# Patient Record
Sex: FEMALE | Race: BLACK OR AFRICAN AMERICAN
[De-identification: names, ages, dates, MRNs, and addresses within clinical notes are randomized per-mention and may not be internally consistent; named-entity substitution may affect disease eponyms.]

---

## 2019-07-15 ENCOUNTER — HOSPITAL ENCOUNTER (EMERGENCY)
Dept: HOSPITAL 97 - ER | Age: 29
Discharge: HOME | End: 2019-07-15
Payer: SELF-PAY
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
## 2019-07-15 DIAGNOSIS — T31.0: ICD-10-CM
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
## 2019-07-15 DIAGNOSIS — T23.692A: Primary | ICD-10-CM
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
## 2019-07-15 PROCEDURE — 99283 EMERGENCY DEPT VISIT LOW MDM: CPT

## 2019-07-15 NOTE — EDPHYS
Physician Documentation                                                                           

 [facility]                                                                 

Name: Bere Perez                                                                              

Age: 28 yrs                                                                                       

Sex: Female                                                                                       

: 1990                                                                                   

MRN: [id number]                                                                                   

Arrival Date: 07/15/2019                                                                          

Time: 14:36                                                                                       

Account#: [id number]                                                                            

Bed 24                                                                                            

Private MD: None, None                                                                            

ED Physician Franklin Lucia                                                                      

HPI:                                                                                              

07/15                                                                                             

14:50 This 28 yrs old Black Female presents to ER via Ambulatory with complaints of Chemical  nikita 

      Exposure.                                                                                   

14:50 The patient or guardian reports pain. The complaints affect the left wrist diffusely.   nikita 

      Context: The problem was sustained at an industrial site. Onset: The symptoms/episode       

      began/occurred just prior to arrival. Modifying factors: The symptoms are alleviated by     

      elevation, holding still, ice/coldpack to affected area, the symptoms are aggravated by     

      movement, dependent position. Associated signs and symptoms: The patient has no             

      apparent associated signs or symptoms. Compartment Syndrome negative for numbness,          

      tingling, positive for pain. The patient has not experienced similar symptoms in the        

      past.                                                                                       

                                                                                                  

Historical:                                                                                       

- Allergies:                                                                                      

14:42 No Known Allergies;                                                                     ss  

- Home Meds:                                                                                      

14:42 None [Active];                                                                          ss  

- PMHx:                                                                                           

14:42 None;                                                                                   ss  

- PSHx:                                                                                           

14:42 L knee repair;                                                                          ss  

                                                                                                  

- Immunization history:: Adult Immunizations up to date.                                          

- Social history:: Smoking status: Patient/guardian denies using tobacco.                         

- Ebola Screening: : Patient denies exposure to infectious person Patient denies travel           

  to an Ebola-affected area in the 21 days before illness onset.                                  

- Family history:: not pertinent.                                                                 

                                                                                                  

                                                                                                  

ROS:                                                                                              

14:50 Constitutional: Negative for fever, chills, and weight loss, Eyes: Negative for injury, nikita 

      pain, redness, and discharge, ENT: Negative for injury, pain, and discharge, Neck:          

      Negative for injury, pain, and swelling, Cardiovascular: Negative for chest pain,           

      palpitations, and edema, Respiratory: Negative for shortness of breath, cough,              

      wheezing, and pleuritic chest pain, Abdomen/GI: Negative for abdominal pain, nausea,        

      vomiting, diarrhea, and constipation, Back: Negative for injury and pain, : Negative      

      for injury, bleeding, discharge, and swelling, Skin: Negative for injury, rash, and         

      discoloration, Neuro: Negative for headache, weakness, numbness, tingling, and seizure,     

      Psych: Negative for depression, anxiety, suicide ideation, homicidal ideation, and          

      hallucinations, Allergy/Immunology: Negative for hives, rash, and allergies, Endocrine:     

      Negative for neck swelling, polydipsia, polyuria, polyphagia, and marked weight             

      changes, Hematologic/Lymphatic: Negative for swollen nodes, abnormal bleeding, and          

      unusual bruising.                                                                           

14:50 MS/extremity: Positive for pain, tenderness, of the left hand.                              

                                                                                                  

Exam:                                                                                             

14:50 Constitutional:  This is a well developed, well nourished patient who is awake, alert,  nikita 

      and in no acute distress. Head/Face:  Normocephalic, atraumatic. Eyes:  Pupils equal        

      round and reactive to light, extra-ocular motions intact.  Lids and lashes normal.          

      Conjunctiva and sclera are non-icteric and not injected.  Cornea within normal limits.      

      Periorbital areas with no swelling, redness, or edema. ENT:  Nares patent. No nasal         

      discharge, no septal abnormalities noted.  Tympanic membranes are normal and external       

      auditory canals are clear.  Oropharynx with no redness, swelling, or masses, exudates,      

      or evidence of obstruction, uvula midline.  Mucous membranes moist. Neck:  Trachea          

      midline, no thyromegaly or masses palpated, and no cervical lymphadenopathy.  Supple,       

      full range of motion without nuchal rigidity, or vertebral point tenderness.  No            

      Meningismus. Chest/axilla:  Normal chest wall appearance and motion.  Nontender with no     

      deformity.  No lesions are appreciated. Cardiovascular:  Regular rate and rhythm with a     

      normal S1 and S2.  No gallops, murmurs, or rubs.  Normal PMI, no JVD.  No pulse             

      deficits. Respiratory:  Lungs have equal breath sounds bilaterally, clear to                

      auscultation and percussion.  No rales, rhonchi or wheezes noted.  No increased work of     

      breathing, no retractions or nasal flaring. Abdomen/GI:  Soft, non-tender, with normal      

      bowel sounds.  No distension or tympany.  No guarding or rebound.  No evidence of           

      tenderness throughout. Back:  No spinal tenderness.  No costovertebral tenderness.          

      Full range of motion. MS/ Extremity:  Pulses equal, no cyanosis.  Neurovascular intact.     

       Full, normal range of motion. Neuro:  Awake and alert, GCS 15, oriented to person,         

      place, time, and situation.  Cranial nerves II-XII grossly intact.  Motor strength 5/5      

      in all extremities.  Sensory grossly intact.  Cerebellar exam normal.  Normal gait.         

      Psych:  Awake, alert, with orientation to person, place and time.  Behavior, mood, and      

      affect are within normal limits.                                                            

14:50 Skin: injury, burn(s), 2nd degree burn injury covers approximately  1% of the total         

      body surface area, and is located on the palmar aspect of left wrist.                       

                                                                                                  

Vital Signs:                                                                                      

14:41  / 85; Pulse 81; Resp 16 S; Temp 98.4(O); Pulse Ox 100% on R/A; Weight 97.52 kg   ca1 

      (R); Height 5 ft. 9 in. (175.26 cm) (R);                                                    

14:41 Body Mass Index 31.75 (97.52 kg, 175.26 cm)                                             ca1 

                                                                                                  

MDM:                                                                                              

14:40 Patient medically screened.                                                             [facility] 

14:53 Data reviewed: vital signs, nurses notes.                                               [facility] 

                                                                                                  

07/15                                                                                             

14:50 Order name: Wound Care: rinse water x 20 min; Complete Time: 15:19                      [facility] 

                                                                                                  

Administered Medications:                                                                         

15:20 Drug: Neosporin Ointment 1 application Route: Topical; Site: affected area;             aj  

                                                                                                  

                                                                                                  

Disposition:                                                                                      

07/15/19 14:54 Discharged to Home. Impression: Burn and corrosion of wrist and hand.              

- Condition is Stable.                                                                            

- Discharge Instructions: Chemical Burn, Adult.                                                   

- Prescriptions for Bactroban 2 % Topical Ointment - Apply to affected area 1                     

  application by TOPICAL route every 12 hours; 30 gram. Ibuprofen 600 mg Oral Tablet -            

  take 1 tablet by ORAL route every 6 hours As needed take with food; 20 tablet.                  

- Medication Reconciliation Form, Thank You Letter, Antibiotic Education, Prescription            

  Opioid Use form.                                                                                

- Follow up: Private Physician; When: 2 - 3 days; Reason: Recheck today's complaints,             

  Continuance of care, Re-evaluation by your physician. Follow up: Jaspal Mims MD;           

  When: Tomorrow; Reason: Recheck today's complaints, Re-evaluation by your physician.            

- Problem is new.                                                                                 

- Symptoms have improved.                                                                         

                                                                                                  

                                                                                                  

                                                                                                  

Signatures:                                                                                       

Shanta Rivers RN RN aj Anderson, Corey, MD MD cha Smirch, Shelby, RN                      RN   ss                                                   

                                                                                                  

Corrections: (The following items were deleted from the chart)                                    

14:55 14:54 07/15/2019 14:54 Discharged to Home. Impression: Burn and corrosion of wrist and  nikita 

      hand. Condition is Stable. Forms are Medication Reconciliation Form, Thank You Letter,      

      Antibiotic Education, Prescription Opioid Use. Follow up: Private Physician; When: 2 -      

      3 days; Reason: Recheck today's complaints, Continuance of care, Re-evaluation by your      

      physician. Problem is new. Symptoms have improved. nikita                                      

15:37 14:55 07/15/2019 14:54 Discharged to Home. Impression: Burn and corrosion of wrist and  aj  

      hand. Condition is Stable. Forms are Medication Reconciliation Form, Thank You Letter,      

      Antibiotic Education, Prescription Opioid Use. Follow up: Private Physician; When: 2 -      

      3 days; Reason: Recheck today's complaints, Continuance of care, Re-evaluation by your      

      physician. Follow up: Jaspal Mims; When: Tomorrow; Reason: Recheck today's               

      complaints, Re-evaluation by your physician. Problem is new. Symptoms have improved. [facility]    

                                                                                                  

**************************************************************************************************

## 2019-07-15 NOTE — ER
Nurse's Notes                                                                                     

 [facility]                                                                 

Name: Bere Perez                                                                              

Age: 28 yrs                                                                                       

Sex: Female                                                                                       

: 1990                                                                                   

MRN: [id number]                                                                                   

Arrival Date: 07/15/2019                                                                          

Time: 14:36                                                                                       

Account#: [id number]                                                                            

Bed 24                                                                                            

Private MD: None, None                                                                            

Diagnosis: Burn and corrosion of wrist and hand                                                   

                                                                                                  

Presentation:                                                                                     

07/15                                                                                             

14:39 Presenting complaint: Patient states: Phenol exposure to L wrist. Pt washed area for 15 ss  

      minutes just after exposure occurred. Denies SOB. C/o irritation to affected area.          

      Transition of care: patient was not received from another setting of care. Onset of         

      symptoms was July 15, 2019. Risk Assessment: Do you want to hurt yourself or someone        

      else? Patient reports no desire to harm self or others. Initial Sepsis Screen: Does the     

      patient have a suspected source of infection? No. Patient's initial sepsis screen is        

      negative. Care prior to arrival: 15 minutes of decon to area of contact.                    

14:39 Method Of Arrival: Ambulatory                                                           ss  

14:39 Acuity: GRETCHEN 5                                                                           ss  

                                                                                                  

Historical:                                                                                       

- Allergies:                                                                                      

14:42 No Known Allergies;                                                                     ss  

- Home Meds:                                                                                      

14:42 None [Active];                                                                          ss  

- PMHx:                                                                                           

14:42 None;                                                                                   ss  

- PSHx:                                                                                           

14:42 L knee repair;                                                                          ss  

                                                                                                  

- Immunization history:: Adult Immunizations up to date.                                          

- Social history:: Smoking status: Patient/guardian denies using tobacco.                         

- Ebola Screening: : Patient denies exposure to infectious person Patient denies travel           

  to an Ebola-affected area in the 21 days before illness onset.                                  

- Family history:: not pertinent.                                                                 

                                                                                                  

                                                                                                  

Screening:                                                                                        

15:20 Abuse screen: Denies threats or abuse. Denies injuries from another. Nutritional        aj  

      screening: No deficits noted. Tuberculosis screening: No symptoms or risk factors           

      identified. Fall Risk None identified.                                                      

                                                                                                  

Assessment:                                                                                       

14:44 Reassessment: Spoke to poison control representative who recommends to [place] area for   ss  

      15-20 minutes with water and after that just give supportive care.                          

15:20 General: Appears in no apparent distress. comfortable, Behavior is calm, cooperative,   aj  

      appropriate for age. Pain: Complains of pain in left arm and palmar aspect of left          

      wrist. Neuro: Level of Consciousness is awake, alert, obeys commands, Oriented to           

      person, place, time, situation. Respiratory: Airway is patent Respiratory effort is         

      even, unlabored, Respiratory pattern is regular, symmetrical. Derm: Skin is intact, is      

      healthy with good turgor, Skin is pink, warm \T\ dry. normal. Injury Description: Burn      

      was sustained 1-2 hours ago. Patient sustained first-degree burn(s) to palmar aspect of     

      left wrist.                                                                                 

                                                                                                  

Vital Signs:                                                                                      

14:41  / 85; Pulse 81; Resp 16 S; Temp 98.4(O); Pulse Ox 100% on R/A; Weight 97.52 kg   ca1 

      (R); Height 5 ft. 9 in. (175.26 cm) (R);                                                    

14:41 Body Mass Index 31.75 (97.52 kg, 175.26 cm)                                             ca1 

                                                                                                  

ED Course:                                                                                        

14:36 Patient arrived in ED.                                                                  dl4 

14:37 None, None is Private Physician.                                                        dl4 

14:39 Shanta Rivers, RN is Primary Nurse.                                                     aj  

14:40 Franklin Lucia MD is Attending Physician.                                             nikita 

14:42 Triage completed.                                                                       ss  

14:42 Arm band placed on right wrist.                                                         ss  

14:54 Jaspal Mims MD is Referral Physician.                                              nikita 

15:20 Patient has correct armband on for positive identification.                             aj  

15:20 No provider procedures requiring assistance completed. Patient did not have IV access   aj  

      during this emergency room visit.                                                           

                                                                                                  

Administered Medications:                                                                         

15:20 Drug: Neosporin Ointment 1 application Route: Topical; Site: affected area;             aj  

                                                                                                  

                                                                                                  

Outcome:                                                                                          

14:54 Discharge ordered by MD.                                                                nikita 

15:20 Discharged to home ambulatory.                                                          aj  

15:20 Condition: good                                                                             

15:20 Discharge instructions given to patient, Instructed on discharge instructions, follow       

      up and referral plans. medication usage, wound care, Demonstrated understanding of          

      instructions, follow-up care, medications, wound care, Prescriptions given X 2.             

15:37 Patient left the ED.                                                                    aj  

                                                                                                  

Signatures:                                                                                       

Shanta Rivers, RN                       Franklin Calderon MD MD cha Smirch, Shelby, RN RN   ss                                                   

Ignacio Jay                                  dl4                                                  

Amanda Atkins RN RN   ca1                                                  

                                                                                                  

Corrections: (The following items were deleted from the chart)                                    

14:44 14:39 Acuity: GRETCHEN 2 ss                                                                  ss  

14:46 14:44 Reassessment: Spoke to poison control who recommends to [place] area for 15-20      ss  

      minutes with water and after that just give supportive care. ss                             

                                                                                                  

**************************************************************************************************